# Patient Record
Sex: FEMALE | Race: OTHER
[De-identification: names, ages, dates, MRNs, and addresses within clinical notes are randomized per-mention and may not be internally consistent; named-entity substitution may affect disease eponyms.]

---

## 2018-09-25 ENCOUNTER — HOSPITAL ENCOUNTER (EMERGENCY)
Dept: HOSPITAL 15 - ER | Age: 63
Discharge: HOME | End: 2018-09-25
Payer: COMMERCIAL

## 2018-09-25 VITALS — HEIGHT: 63 IN | BODY MASS INDEX: 28.53 KG/M2 | WEIGHT: 161 LBS

## 2018-09-25 VITALS — SYSTOLIC BLOOD PRESSURE: 162 MMHG | DIASTOLIC BLOOD PRESSURE: 90 MMHG

## 2018-09-25 DIAGNOSIS — R11.2: ICD-10-CM

## 2018-09-25 DIAGNOSIS — F12.90: Primary | ICD-10-CM

## 2018-09-25 LAB
ALBUMIN SERPL-MCNC: 3.6 G/DL (ref 3.4–5)
ALP SERPL-CCNC: 114 U/L (ref 45–117)
ALT SERPL-CCNC: 32 U/L (ref 13–56)
ANION GAP SERPL CALCULATED.3IONS-SCNC: 10 MMOL/L (ref 5–15)
BILIRUB SERPL-MCNC: 0.4 MG/DL (ref 0.2–1)
BUN SERPL-MCNC: 10 MG/DL (ref 7–18)
BUN/CREAT SERPL: 12.7
CALCIUM SERPL-MCNC: 8.5 MG/DL (ref 8.5–10.1)
CHLORIDE SERPL-SCNC: 109 MMOL/L (ref 98–107)
CO2 SERPL-SCNC: 24 MMOL/L (ref 21–32)
GLUCOSE SERPL-MCNC: 130 MG/DL (ref 74–106)
HCT VFR BLD AUTO: 40.8 % (ref 36–46)
HGB BLD-MCNC: 14 G/DL (ref 12.2–16.2)
MAGNESIUM SERPL-MCNC: 1.8 MG/DL (ref 1.6–2.6)
MCH RBC QN AUTO: 32.8 PG (ref 28–32)
MCV RBC AUTO: 95.4 FL (ref 80–100)
NRBC BLD QL AUTO: 0 %
POTASSIUM SERPL-SCNC: 3.4 MMOL/L (ref 3.5–5.1)
PROT SERPL-MCNC: 7.4 G/DL (ref 6.4–8.2)
SODIUM SERPL-SCNC: 143 MMOL/L (ref 136–145)

## 2018-09-25 PROCEDURE — 96361 HYDRATE IV INFUSION ADD-ON: CPT

## 2018-09-25 PROCEDURE — 96374 THER/PROPH/DIAG INJ IV PUSH: CPT

## 2018-09-25 PROCEDURE — 85025 COMPLETE CBC W/AUTO DIFF WBC: CPT

## 2018-09-25 PROCEDURE — 99285 EMERGENCY DEPT VISIT HI MDM: CPT

## 2018-09-25 PROCEDURE — 80053 COMPREHEN METABOLIC PANEL: CPT

## 2018-09-25 PROCEDURE — 74176 CT ABD & PELVIS W/O CONTRAST: CPT

## 2018-09-25 PROCEDURE — 84484 ASSAY OF TROPONIN QUANT: CPT

## 2018-09-25 PROCEDURE — 36415 COLL VENOUS BLD VENIPUNCTURE: CPT

## 2018-09-25 PROCEDURE — 71046 X-RAY EXAM CHEST 2 VIEWS: CPT

## 2018-09-25 PROCEDURE — 93005 ELECTROCARDIOGRAM TRACING: CPT

## 2018-09-25 PROCEDURE — 83735 ASSAY OF MAGNESIUM: CPT

## 2018-09-25 PROCEDURE — 96375 TX/PRO/DX INJ NEW DRUG ADDON: CPT

## 2022-06-04 ENCOUNTER — HOSPITAL ENCOUNTER (EMERGENCY)
Dept: HOSPITAL 15 - ER | Age: 67
Discharge: HOME | End: 2022-06-04
Payer: MEDICARE

## 2022-06-04 VITALS — BODY MASS INDEX: 25.61 KG/M2 | HEIGHT: 64 IN | WEIGHT: 150 LBS

## 2022-06-04 VITALS — SYSTOLIC BLOOD PRESSURE: 174 MMHG | DIASTOLIC BLOOD PRESSURE: 95 MMHG

## 2022-06-04 DIAGNOSIS — F32.9: ICD-10-CM

## 2022-06-04 DIAGNOSIS — I10: ICD-10-CM

## 2022-06-04 DIAGNOSIS — G89.4: ICD-10-CM

## 2022-06-04 DIAGNOSIS — Z76.0: ICD-10-CM

## 2022-06-04 DIAGNOSIS — F41.9: Primary | ICD-10-CM

## 2022-06-04 PROCEDURE — 99283 EMERGENCY DEPT VISIT LOW MDM: CPT

## 2022-06-04 PROCEDURE — 96374 THER/PROPH/DIAG INJ IV PUSH: CPT

## 2025-03-11 ENCOUNTER — HOSPITAL ENCOUNTER (OUTPATIENT)
Dept: HOSPITAL 15 - LAB | Age: 70
Discharge: HOME | End: 2025-03-11
Attending: INTERNAL MEDICINE
Payer: COMMERCIAL

## 2025-03-11 DIAGNOSIS — N39.0: Primary | ICD-10-CM

## 2025-03-11 PROCEDURE — 87086 URINE CULTURE/COLONY COUNT: CPT

## 2025-03-15 ENCOUNTER — HOSPITAL ENCOUNTER (EMERGENCY)
Dept: HOSPITAL 15 - ER | Age: 70
Discharge: LEFT BEFORE DISCHARGE | End: 2025-03-15
Payer: COMMERCIAL

## 2025-03-15 VITALS
RESPIRATION RATE: 18 BRPM | TEMPERATURE: 97.9 F | HEART RATE: 83 BPM | SYSTOLIC BLOOD PRESSURE: 148 MMHG | DIASTOLIC BLOOD PRESSURE: 70 MMHG | OXYGEN SATURATION: 95 %

## 2025-03-15 VITALS — WEIGHT: 169.98 LBS | BODY MASS INDEX: 30.12 KG/M2 | HEIGHT: 63 IN

## 2025-03-15 DIAGNOSIS — Z53.21: ICD-10-CM

## 2025-03-15 DIAGNOSIS — I10: Primary | ICD-10-CM

## 2025-03-20 ENCOUNTER — HOSPITAL ENCOUNTER (OUTPATIENT)
Dept: HOSPITAL 15 - LAB | Age: 70
Discharge: HOME | End: 2025-03-20
Attending: INTERNAL MEDICINE
Payer: COMMERCIAL

## 2025-03-20 DIAGNOSIS — I10: Primary | ICD-10-CM

## 2025-03-20 LAB
ALBUMIN SERPL-MCNC: 4.7 G/DL (ref 3.2–4.8)
ALP SERPL-CCNC: 111 U/L (ref 46–116)
ALT SERPL-CCNC: < 9 U/L (ref 7–40)
ANION GAP SERPL CALCULATED.3IONS-SCNC: 6 MMOL/L (ref 5–15)
BILIRUB SERPL-MCNC: 0.5 MG/DL (ref 0.2–1)
BUN SERPL-MCNC: 12 MG/DL (ref 9–23)
BUN/CREAT SERPL: 14.8 (ref 10–20)
CALCIUM SERPL-MCNC: 10 MG/DL (ref 8.7–10.4)
CHLORIDE SERPL-SCNC: 107 MMOL/L (ref 98–107)
CHOLEST SERPL-MCNC: 163 MG/DL (ref ?–200)
CO2 SERPL-SCNC: 28 MMOL/L (ref 20–31)
GLUCOSE SERPL-MCNC: 105 MG/DL (ref 74–106)
HCT VFR BLD AUTO: 38.8 % (ref 36–46)
HDLC SERPL-MCNC: 56 MG/DL (ref 40–59)
HGB BLD-MCNC: 13.4 G/DL (ref 12.2–16.2)
MCH RBC QN AUTO: 32.5 PG (ref 28–32)
MCV RBC AUTO: 94.4 FL (ref 80–100)
NRBC BLD QL AUTO: 0.1 %
POTASSIUM SERPL-SCNC: 4.3 MMOL/L (ref 3.5–5.1)
PROT SERPL-MCNC: 7.2 G/DL (ref 5.7–8.2)
SODIUM SERPL-SCNC: 141 MMOL/L (ref 136–145)
T4 FREE SERPL-MCNC: 1.23 NG/DL (ref 0.89–1.76)
TRIGL SERPL-MCNC: 83 MG/DL (ref ?–150)

## 2025-03-20 PROCEDURE — 84439 ASSAY OF FREE THYROXINE: CPT

## 2025-03-20 PROCEDURE — 83036 HEMOGLOBIN GLYCOSYLATED A1C: CPT

## 2025-03-20 PROCEDURE — 81001 URINALYSIS AUTO W/SCOPE: CPT

## 2025-03-20 PROCEDURE — 87086 URINE CULTURE/COLONY COUNT: CPT

## 2025-03-20 PROCEDURE — 80061 LIPID PANEL: CPT

## 2025-03-20 PROCEDURE — 85025 COMPLETE CBC W/AUTO DIFF WBC: CPT

## 2025-03-20 PROCEDURE — 36415 COLL VENOUS BLD VENIPUNCTURE: CPT

## 2025-03-20 PROCEDURE — 82607 VITAMIN B-12: CPT

## 2025-03-20 PROCEDURE — 80053 COMPREHEN METABOLIC PANEL: CPT

## 2025-03-20 PROCEDURE — 82274 ASSAY TEST FOR BLOOD FECAL: CPT

## 2025-03-20 PROCEDURE — 84443 ASSAY THYROID STIM HORMONE: CPT

## 2025-03-20 PROCEDURE — 82306 VITAMIN D 25 HYDROXY: CPT

## 2025-03-24 ENCOUNTER — HOSPITAL ENCOUNTER (OUTPATIENT)
Dept: HOSPITAL 15 - LAB | Age: 70
Discharge: HOME | End: 2025-03-24
Attending: INTERNAL MEDICINE
Payer: COMMERCIAL

## 2025-03-24 DIAGNOSIS — N39.0: Primary | ICD-10-CM

## 2025-03-24 PROCEDURE — 81001 URINALYSIS AUTO W/SCOPE: CPT

## 2025-03-24 PROCEDURE — 87086 URINE CULTURE/COLONY COUNT: CPT

## 2025-03-26 ENCOUNTER — HOSPITAL ENCOUNTER (EMERGENCY)
Dept: HOSPITAL 15 - ER | Age: 70
Discharge: HOME | End: 2025-03-26
Payer: COMMERCIAL

## 2025-03-26 VITALS — HEIGHT: 63 IN | BODY MASS INDEX: 29.38 KG/M2 | WEIGHT: 165.79 LBS

## 2025-03-26 VITALS
TEMPERATURE: 98.7 F | DIASTOLIC BLOOD PRESSURE: 86 MMHG | HEART RATE: 83 BPM | OXYGEN SATURATION: 95 % | SYSTOLIC BLOOD PRESSURE: 149 MMHG | RESPIRATION RATE: 16 BRPM

## 2025-03-26 VITALS — OXYGEN SATURATION: 96 % | RESPIRATION RATE: 18 BRPM | HEART RATE: 72 BPM

## 2025-03-26 DIAGNOSIS — I10: ICD-10-CM

## 2025-03-26 DIAGNOSIS — Z90.710: ICD-10-CM

## 2025-03-26 DIAGNOSIS — Z90.49: ICD-10-CM

## 2025-03-26 DIAGNOSIS — F12.90: ICD-10-CM

## 2025-03-26 DIAGNOSIS — F41.9: ICD-10-CM

## 2025-03-26 DIAGNOSIS — R11.2: Primary | ICD-10-CM

## 2025-03-26 DIAGNOSIS — F32.9: ICD-10-CM

## 2025-03-26 LAB
ALBUMIN SERPL-MCNC: 5 G/DL (ref 3.2–4.8)
ALP SERPL-CCNC: 107 U/L (ref 46–116)
ALT SERPL-CCNC: 15 U/L (ref 7–40)
ANION GAP SERPL CALCULATED.3IONS-SCNC: 5 MMOL/L (ref 5–15)
BILIRUB SERPL-MCNC: 0.5 MG/DL (ref 0.2–1)
BUN SERPL-MCNC: 12 MG/DL (ref 9–23)
BUN/CREAT SERPL: 16.4 (ref 10–20)
CALCIUM SERPL-MCNC: 10 MG/DL (ref 8.7–10.4)
CHLORIDE SERPL-SCNC: 108 MMOL/L (ref 98–107)
CO2 SERPL-SCNC: 27 MMOL/L (ref 20–31)
GLUCOSE SERPL-MCNC: 98 MG/DL (ref 74–106)
HCT VFR BLD AUTO: 41.1 % (ref 36–46)
HGB BLD-MCNC: 14 G/DL (ref 12.2–16.2)
MCH RBC QN AUTO: 32.3 PG (ref 28–32)
MCV RBC AUTO: 94.6 FL (ref 80–100)
NRBC BLD QL AUTO: 0.1 %
POTASSIUM SERPL-SCNC: 3.9 MMOL/L (ref 3.5–5.1)
PROT SERPL-MCNC: 7.7 G/DL (ref 5.7–8.2)
SODIUM SERPL-SCNC: 140 MMOL/L (ref 136–145)

## 2025-03-26 PROCEDURE — 80053 COMPREHEN METABOLIC PANEL: CPT

## 2025-03-26 PROCEDURE — 76705 ECHO EXAM OF ABDOMEN: CPT

## 2025-03-26 PROCEDURE — 36415 COLL VENOUS BLD VENIPUNCTURE: CPT

## 2025-03-26 PROCEDURE — 85025 COMPLETE CBC W/AUTO DIFF WBC: CPT

## 2025-03-26 NOTE — DV[facility]
EXAM: US GALLBLADDER



INDICATION: pain



TECHNIQUE:  Multiple real-time sonographic images were obtained of the right upper quadrant.



COMPARISON: None



FINDINGS: The liver demonstrates homogenous echotexture without focal mass lesions. The liver measure
s 15.1 cm. There is hepatopedal color doppler flow in the main portal vein. There is no intrahepatic 
biliary ductal dilatation.  



The gallbladder is without evidence of stone or sludge.  The gallbladder wall measures  0.2 cm.   The
 common bile duct measures  0.4 cm.  There is a negative sonographic Pleitez's sign.



The right kidney measures  9.8 cm.  The right kidney is normal in contour, size, and shape.  The echo
genicity is normal.  There is no hydronephrosis.



The pancreas is not well visualized due to overlying bowel gas.



Visualized portions of the aorta and inferior vena cava are unremarkable.



No evidence of ascites.



IMPRESSION: 



1. No evidence of acute abnormality.



HS:Y



Electronically Signed by: Priscilla Miller at 03/26/2025 12:47:31 PM

## 2025-03-26 NOTE — ED.PDOC
History of Present Illness


HPI Comments


70-year-old female with PMHx HTN, Depression, Anxiety presents with a chief 

complaint of urinary frequency, nausea, and vomiting. Patient states that she 

has been having to go to the bathroom to urinate very often. Patient also 

mentions that she is having nausea with intermittent episodes of vomiting. 

Patient is not actively vomiting at this time. Patient endorses marijuana use, 

but stopped taking it x 1 week ago. No other symptoms or modifying factors 

present at this time.


Chief Complaint:  Nausea/Vomiting


Time Seen by MD:  11:42


Primary Care Provider:  ROBBY Gracia Notes:  Medications, Allergies


Allergies:  


Coded Allergies:  


     No Known Drug Allergy (Verified  Allergy, Unknown, 3/26/18)


Home Meds


Active Scripts


Ondansetron (Zofran) 4 Mg Tab, 4 MG PO QID, #20 MG 0 Refills


   Prov:CLEMENCIA JOE MD         22


Information Source:  Patient


Mode of Arrival:  Ambulatory


Severity:  Moderate


Timing:  Days


Duration:  Since onset


Prehospital treatment:  None





Past Medical History


PAST MEDICAL HISTORY:  Anxiety, Depression, HTN


Surgical History:  Appendectomy, , Hysterectomy


Surgical History (Other):  


NECK, TMJ, Left FOOT


GYN History:  Denies all GYN Hx





Family History


Family History:  Reviewed,noncontributory to illness





Social History


Smoker:  Non-Smoker


Alcohol:  Denies ETOH Use


Drugs:  Marijuana


Lives In:  Home





Constitutional:  denies: chills, diaphoresis, fatigue, fever, malaise, sweats, 

weakness, others


EENTM:  denies: blurred vision, double vision, ear bleeding, ear discharge, ear 

drainage, ear pain, ear ringing, eye pain, eye redness, hearing loss, mouth 

pain, mouth swelling, nasal discharge, nose bleeding, nose congestion, nose 

pain, photophobia, tearing, throat pain, throat swelling, voice changes, others


Respiratory:  denies: cough, hemoptysis, orthopnea, SOB at rest, shortness of 

breath, SOB with excertion, stridor, wheezing, others


Cardiovascular:  denies: chest pain, dizzy spells, diaphoresis, Dyspnea on 

exertion, edema, irregular heart beat, left arm pain, lightheadedness, 

palpitations, PND, syncope, others


Gastrointestinal:  reports: nausea, vomiting; denies: abdomen distended, 

abdominal pain, blood streaked bowels, constipated, diarrhea, dysphagia, 

difficulty swallowing, hematemesis, melena, poor appetite, poor fluid intake, 

rectal bleeding, rectal pain, others


Genitourinary:  reports: frequency; denies: abnormal vagina bleeding, burning, 

dyspareunia, dysuria, flank pain, hematuria, incontinence, pain, pregnant, 

vagina discharge, urgency, others


Neurological:  denies: dizziness, fainting, headache, left sided numbness, left 

sided weakness, numbness, paresthesia, pre-existing deficit, right sided 

numbness, right sided weakness, seizure, speech problems, tingling, tremors, 

weakness, others


Musculoskeletal:  denies: back pain, gout, joint pain, joint swelling, muscle 

pain, muscle stiffness, neck pain, others


Integumetry:  denies: bruises, change in color, change in hair/nails, dryness, 

laceration, lesions, lumps, rash, wounds, others


Allergic/Immunocompromised:  denies: Difficulty Healing, Frequent Infections, 

Hives, Itching, others


Hematologic/Lymphatic:  denies: anemia, blood clots, easy bleeding, easy 

bruising, swollen glands, others


Endocrine:  denies: excessive hunger, excessive sweating, excessive thirst, 

excessive urination, flushing, intolerance to cold, intolerance to heat, 

unexplained weight gain, unexplained weight loss, others


Psychiatric:  denies: anxiety, bipolar disorder, depression, hopeless, panic 

disorder, schizophrenia, sleepless, suicidal, others


All Other Systems:  Reviewed and Negative





Physical Exam


General Appearance:  Mild Distress


HEENT:  Normal ENT Inspection, Pharynx Normal, TMs Normal


Neck:  Full Range of Motion, Non-Tender, Normal, Normal Inspection


Respiratory:  Chest Non-Tender, Lungs Clear, No Accessory Muscle Use, No 

Respiratory Distress, Normal Breath Sounds


Cardiovascular:  No Edema, No JVD, No Murmur, No Gallop, Normal Peripheral 

Pulses, Regular Rate/Rhythm


Breast Exam:  Deferred


Gastrointestinal:  No Organomegaly, Non Tender, No Pulsatile Mass, Normal Bowel 

Sounds, Soft


Genitalia:  Deferred


Pelvic:  Deferred


Rectal:  Deferred


Extremities:  No calf tenderness, Normal capillary refill, Normal inspection, 

Normal range of motion, Non-tender, No pedal edema


Musculoskeletal :  


   Apperance:  Normal


Neurologic:  Alert, CNs II-XII nml as Tested, No Motor Deficits, Normal Affect, 

Normal Mood, No Sensory Deficits


Cerebellar Function:  Normal


Reflexes:  Normal


Skin:  Dry, Normal Color, Warm


Lymphatic:  No Adenopathy





Was a procedure done?


Was a procedure done?:  No





Differential Dx


Considerations may include:


Cannabinoid emesis, electrolyte imbalance, UTI





X-Ray, Labs, Meds, VS





                                   Vital Signs








  Date Time  Temp Pulse Resp B/P (MAP) Pulse Ox O2 Delivery O2 Flow Rate FiO2


 


3/26/25 12:42 97.6 72 18 140/74 (96) 96   





 97.6       


 


3/26/25 12:42  72 18  96 Room Air* 0 21


 


3/26/25 11:37 99.3 82 18 155/79 (104) 96   





 99.3       








                                       Lab








Test


 3/26/25


12:50 Range/Units


 


 


White Blood Count


 4.9 


 4.4-10.8


10^3/uL


 


Red Blood Count


 4.34 


 4.0-5.20


10^6/uL


 


Hemoglobin 14.0  12.2-16.2  g/dL


 


Hematocrit 41.1  36.0-46.0  %


 


Mean Corpuscular Volume 94.6  80.0-100.0  fL


 


Mean Corpuscular Hemoglobin 32.3 H 28.0-32.0  pg


 


Mean Corpuscular Hemoglobin


Concent 34.1 


 32.0-36.0  g/dL





 


Red Cell Distribution Width 13.4  11.8-14.3  %


 


Platelet Count


 230 


 140-450


10^3/uL


 


Mean Platelet Volume 10.3  6.9-10.8  fL


 


Neutrophils (%) (Auto) 58.4  37.0-80.0  %


 


Lymphocytes (%) (Auto) 28.9  10.0-50.0  %


 


Monocytes (%) (Auto) 12.2 H 0.0-12.0  %


 


Eosinophils (%) (Auto) 0.1  0.0-7.0  %


 


Basophils (%) (Auto) 0.4  0.0-2.0  %


 


Neutrophils # (Auto)


 2.9 


 1.6-8.6  10


^3/uL


 


Lymphocytes # (Auto)


 1.4 


 0.4-5.4  10


^3/uL


 


Monocytes # (Auto) 0.6  0-1.3  10 ^3/uL


 


Eosinophils # (Auto) 0  0-0.8  10 ^3/uL


 


Basophils # (Auto) 0  0-0.2  10 ^3/uL


 


Nucleated Red Blood Cells 0.1   %


 


Sodium Level 140  136-145  mmol/L


 


Potassium Level 3.9  3.5-5.1  mmol/L


 


Chloride Level 108 H   mmol/L


 


Carbon Dioxide Level 27  20-31  mmol/L


 


Anion Gap 5  5-15  


 


Blood Urea Nitrogen 12  9-23  mg/dL


 


Creatinine


 0.73 


 0.550-1.02


mg/dL


 


Glomerular Filtration Rate


Calc 90 


 >90  mL/min





 


BUN/Creatinine Ratio 16.4  10.0-20.0  


 


Serum Glucose 98    mg/dL


 


Calcium Level 10.0  8.7-10.4  mg/dL


 


Total Bilirubin 0.5  0.2-1.0  mg/dL


 


Aspartate Amino Transferase


(AST) 17 


 13-40  U/L





 


Alanine Aminotransferase (ALT) 15  7-40  U/L


 


Alkaline Phosphatase 107    U/L


 


Total Protein 7.7  5.7-8.2  g/dL


 


Albumin 5.0 H 3.2-4.8  g/dL





Gallbladder US Impression:


No evidence of acute abnormality.


The patient's CBC is within normal limits 


The chemistry panel is within normal limits 


The patient was being discharged and will follow up with the primary care doctor




The patient will return to the emergency department's the condition worsens.  


The patient was given Zofran and Protonix


Time of 1ST Reevaluation:  12:12


Reevaluation 1ST:  Unchanged


Patient Education/Counseling:  Diagnosis, Treatment, Prognosis, Need For Follow 

Up


Family Education/Counseling:  No Family Present





Departure 1


Departure


Time of Disposition:  14:17


Impression:  


   Primary Impression:  


   Vomiting


   Qualified Codes:  R11.14 - Bilious vomiting


   Additional Impression:  


   Cannabinoid hyperemesis syndrome


Disposition:  01 HOME / SELF CARE / HOMELESS


Condition:  Fair


Discharged With:  Self





Critical Care Note


Critical Care Time?:  No





Stability


Stability form required:  No





Heart Score


Heart Score:  








Heart Score Response (Comments) Value


 


History N/A 0


 


EKG N/A 0


 


Age N/A 0


 


Risk Factors N/A 0


 


Troponin N/A 0


 


Total  0














I personally scribed for JASPER DE LA GARZA MD ([email or web address]) on 3/26/25 at 11:49.  

Electronically submitted by Aashish Nunn ([email or web address]).


I personally scribed for JASPER DE LA GARZA MD ([email or web address]) on 3/26/25 at 13:22.  

Electronically submitted by Aashish Nunn ([email or web address]).





JASPER DE LA GARZA MD              Mar 26, 2025 11:49